# Patient Record
Sex: FEMALE | URBAN - METROPOLITAN AREA
[De-identification: names, ages, dates, MRNs, and addresses within clinical notes are randomized per-mention and may not be internally consistent; named-entity substitution may affect disease eponyms.]

---

## 2018-03-27 ENCOUNTER — CONSULTATION (OUTPATIENT)
Dept: URBAN - METROPOLITAN AREA CLINIC 87 | Facility: CLINIC | Age: 58
End: 2018-03-27

## 2018-03-27 DIAGNOSIS — H35.3132: ICD-10-CM

## 2018-03-27 DIAGNOSIS — H53.001: ICD-10-CM

## 2018-03-27 DIAGNOSIS — H25.13: ICD-10-CM

## 2018-03-27 PROCEDURE — 1036F TOBACCO NON-USER: CPT

## 2018-03-27 PROCEDURE — 4177F TALK PT/CRGVR RE AREDS PREV: CPT

## 2018-03-27 PROCEDURE — 99204 OFFICE O/P NEW MOD 45 MIN: CPT

## 2018-03-27 PROCEDURE — 92225 OPHTHALMOSCOPY (INITIAL): CPT

## 2018-03-27 PROCEDURE — 92134 CPTRZ OPH DX IMG PST SGM RTA: CPT

## 2018-03-27 PROCEDURE — 92250 FUNDUS PHOTOGRAPHY W/I&R: CPT

## 2018-03-27 PROCEDURE — G8482 FLU IMMUNIZE ORDER/ADMIN: HCPCS

## 2018-03-27 PROCEDURE — 2019F DILATED MACUL EXAM DONE: CPT

## 2018-03-27 PROCEDURE — G8427 DOCREV CUR MEDS BY ELIG CLIN: HCPCS

## 2018-03-27 PROCEDURE — 4040F PNEUMOC VAC/ADMIN/RCVD: CPT | Mod: 8P

## 2018-03-27 ASSESSMENT — VISUAL ACUITY
OS_CC: 20/20
OD_PH: 20/40
OD_CC: 20/50
OS_SC: 20/20-1
OD_SC: 20/50-2

## 2018-03-27 ASSESSMENT — TONOMETRY
OS_IOP_MMHG: 21
OD_IOP_MMHG: 21

## 2019-10-22 ENCOUNTER — APPOINTMENT (RX ONLY)
Dept: URBAN - METROPOLITAN AREA CLINIC 154 | Facility: CLINIC | Age: 59
Setting detail: DERMATOLOGY
End: 2019-10-22

## 2019-10-22 DIAGNOSIS — L98.8 OTHER SPECIFIED DISORDERS OF THE SKIN AND SUBCUTANEOUS TISSUE: ICD-10-CM

## 2019-10-22 PROBLEM — E13.9 OTHER SPECIFIED DIABETES MELLITUS WITHOUT COMPLICATIONS: Status: ACTIVE | Noted: 2019-10-22

## 2019-10-22 PROBLEM — F41.9 ANXIETY DISORDER, UNSPECIFIED: Status: ACTIVE | Noted: 2019-10-22

## 2019-10-22 PROCEDURE — ? PRESCRIPTION

## 2019-10-22 PROCEDURE — ? MEDICAL CONSULTATION: DYNAMIC RHYTIDES

## 2019-10-22 PROCEDURE — ? FULL BODY SKIN EXAM - DECLINED

## 2019-10-22 PROCEDURE — 99202 OFFICE O/P NEW SF 15 MIN: CPT

## 2019-10-22 PROCEDURE — ? COUNSELING

## 2019-10-22 RX ORDER — TRETINOIN 0.25 MG/G
CREAM TOPICAL
Qty: 1 | Refills: 0 | Status: ERX | COMMUNITY
Start: 2019-10-22

## 2019-10-22 RX ADMIN — TRETINOIN: 0.25 CREAM TOPICAL at 20:50

## 2019-10-22 ASSESSMENT — LOCATION DETAILED DESCRIPTION DERM
LOCATION DETAILED: LEFT MEDIAL BUCCAL CHEEK
LOCATION DETAILED: RIGHT INFERIOR TEMPLE
LOCATION DETAILED: RIGHT LOWER CUTANEOUS LIP
LOCATION DETAILED: LEFT MID TEMPLE

## 2019-10-22 ASSESSMENT — LOCATION SIMPLE DESCRIPTION DERM
LOCATION SIMPLE: RIGHT TEMPLE
LOCATION SIMPLE: LEFT TEMPLE
LOCATION SIMPLE: RIGHT LIP
LOCATION SIMPLE: LEFT CHEEK

## 2019-10-22 ASSESSMENT — LOCATION ZONE DERM
LOCATION ZONE: LIP
LOCATION ZONE: FACE

## 2019-10-22 NOTE — PROCEDURE: COUNSELING
Patient Specific Counseling (Will Not Stick From Patient To Patient): Discussed skin care regimen including the following:\\nAM: 1. Gentle cleanser such as neutragena gentle cleanser. 2. Antioxidant serum such as skinceutical CE Ferrulic. 3. SPF 30 or greater sun screen such as Elta MD UV Clear. Alternatively 2 and 3 could be combined with Elta MD UV replenish\\nPM: 1. Eye cream 2. Retinoid or retinol. E-Rx for tretinoin 0.025% cream; start MWF and increase up to nightly as tolerated. Discussed that will not be covered by insurance; may use [employer] coupon. 3. Hyaluronic acid containing cream such as neutrogena hydroboost Patient Specific Counseling (Will Not Stick From Patient To Patient): Discussed skin care regimen including the following:\\nAM: 1. Gentle cleanser such as neutragena gentle cleanser. 2. Antioxidant serum such as skinceutical CE Ferrulic. 3. SPF 30 or greater sun screen such as Elta MD UV Clear. Alternatively 2 and 3 could be combined with Elta MD UV replenish\\nPM: 1. Eye cream 2. Retinoid or retinol. E-Rx for tretinoin 0.025% cream; start MWF and increase up to nightly as tolerated. Discussed that will not be covered by insurance; may use [employer] coupon. 3. Hyaluronic acid containing cream such as neutrogena hydroboost

## 2019-10-22 NOTE — HPI: DRY SKIN
How Severe Is Your Dry Skin?: mild
How Many Showers Or Baths Do You Take In One Day?: 1
Additional History: Pt states she has been noticing more lines and breakouts.

## 2021-02-15 ENCOUNTER — APPOINTMENT (RX ONLY)
Dept: URBAN - METROPOLITAN AREA CLINIC 57 | Facility: CLINIC | Age: 61
Setting detail: DERMATOLOGY
End: 2021-02-15

## 2021-02-15 DIAGNOSIS — Z41.9 ENCOUNTER FOR PROCEDURE FOR PURPOSES OTHER THAN REMEDYING HEALTH STATE, UNSPECIFIED: ICD-10-CM

## 2021-02-15 PROCEDURE — ? COSMETIC CONSULTATION: CHEMICAL PEELS

## 2021-02-15 PROCEDURE — ? HYDRAFACIAL

## 2021-02-15 PROCEDURE — ? MEDICAL CONSULTATION HYDRAFACIAL

## 2021-02-15 PROCEDURE — ? ADDITIONAL NOTES

## 2021-02-15 PROCEDURE — ? MEDICAL CONSULTATION: PRODUCTS

## 2021-02-15 ASSESSMENT — LOCATION DETAILED DESCRIPTION DERM: LOCATION DETAILED: LEFT INFERIOR CENTRAL MALAR CHEEK

## 2021-02-15 ASSESSMENT — LOCATION SIMPLE DESCRIPTION DERM: LOCATION SIMPLE: LEFT CHEEK

## 2021-02-15 ASSESSMENT — LOCATION ZONE DERM: LOCATION ZONE: FACE

## 2021-02-15 NOTE — PROCEDURE: COSMETIC CONSULTATION: CHEMICAL PEELS
Consultation Charge $ (Use Numbers Only, No Special Characters Or $): 145 Price (Use Numbers Only, No Special Characters Or $): 470

## 2021-02-15 NOTE — PROCEDURE: ADDITIONAL NOTES
Detail Level: Zone
Additional Notes: Highly suggested patient see Jane for evaluation on Botox and/or fillers, for more instant gratification and longevity of treatment.
Render Risk Assessment In Note?: no
Additional Notes: Educated patient on the importance of realistic expectations. Initially patient did not want to spend more than $130 as that is what was quoted to her over the phone when making her appointment. After listening to what the patients concerns were and what she was looking for, I advised her she would be better suited to have injectables done. I then proceeded to educate her on deluxe hydrafacial as she was very adamant about having a service today. Patient asked if she could set up a payment plan, I advised her we offer [employer]. Patient disclosed she has a [employer] account and would like to move forward with the deluxe hydrafacial today.

## 2021-02-15 NOTE — PROCEDURE: HYDRAFACIAL
Price (Use Numbers Only, No Special Characters Or $): 890 Price (Use Numbers Only, No Special Characters Or $): 940

## 2021-02-15 NOTE — HPI: COSMETIC CONSULTATION
When Outside In The Sun, Do You...: mostly burns, rarely tans
Additional History: Patient states she has had very inexpensive chemical peels before, and that is what she was looking for today.

## 2021-02-15 NOTE — PROCEDURE: HYDRAFACIAL
Comments: Took before and after photos, shared with patient and patient stated noticeable improvement and was pleased with results. Advised patient the softness and glow of her skin post hydrafacial can last for a few weeks but will slowly dissipate.

## 2021-02-15 NOTE — PROCEDURE: HYDRAFACIAL
Post-Care Instructions: I reviewed with the patient in detail post-care instructions. Patient should avoid direct sun exposure and wear sunscreen daily. Instructed patient to avoid retinol use for 7 days post hydrafacial.